# Patient Record
Sex: FEMALE | Race: WHITE
[De-identification: names, ages, dates, MRNs, and addresses within clinical notes are randomized per-mention and may not be internally consistent; named-entity substitution may affect disease eponyms.]

---

## 2018-03-17 ENCOUNTER — HOSPITAL ENCOUNTER (EMERGENCY)
Dept: HOSPITAL 80 - FED | Age: 33
Discharge: HOME | End: 2018-03-17
Payer: SELF-PAY

## 2018-03-17 VITALS
OXYGEN SATURATION: 98 % | SYSTOLIC BLOOD PRESSURE: 125 MMHG | RESPIRATION RATE: 16 BRPM | HEART RATE: 74 BPM | DIASTOLIC BLOOD PRESSURE: 74 MMHG

## 2018-03-17 VITALS — TEMPERATURE: 98.6 F

## 2018-03-17 DIAGNOSIS — M79.605: ICD-10-CM

## 2018-03-17 DIAGNOSIS — E86.9: ICD-10-CM

## 2018-03-17 DIAGNOSIS — R07.9: Primary | ICD-10-CM

## 2018-03-17 LAB — PLATELET # BLD: 245 10^3/UL (ref 150–400)

## 2018-03-17 NOTE — CPEKG
Heart Rate: 92

RR Interval: 652

P-R Interval: 164

QRSD Interval: 86

QT Interval: 356

QTC Interval: 441

P Axis: 72

QRS Axis: 64

T Wave Axis: 66

EKG Severity - NORMAL ECG -

EKG Impression: SINUS RHYTHM

Electronically Signed By: Desire Lewis 17-Mar-2018 21:08:41

## 2018-03-17 NOTE — EDPHY
H & P


Time Seen by Provider: 03/17/18 13:22


HPI/ROS: 





CHIEF COMPLAINT:  Left leg pain, chest pain





HISTORY OF PRESENT ILLNESS:  Patient is a 32-year-old female who presents 

emergency department with multiple complaints.  Patient traveled from [place] 5 

days ago.  She is on a 10 weak all day in the [place]  Last evening she developed 

some mild chest pain.  It is intermittent.  Patient also has left leg pain.  

This initially started in the left calf and now moved to the left thigh.  She 

has no leg swelling.  Patient denies shortness of breath.  No fevers or chills.

  No cough.  No previous clots or family clotting history.





REVIEW OF SYSTEMS:  


My complete review of systems is negative except as mentioned in the HPI.


Past Medical/Surgical History: 





Denies





Past surgical history:  Denies


Smoking Status: Never smoked


Physical Exam: 





Vitals noted.  Heart rate 89. O2 saturation 96%


GENERAL:  Well-appearing, in no acute distress, alert.


HEENT:  Eyes normal to inspection, normal pharynx, no signs of dehydration.


NECK:  No thyromegaly, no lymphadenopathy, supple.


RESPIRATORY:  Clear to auscultation bilaterally, no rales, rhonchi or wheezing.


CVS:  Regular rate and rhythm, no rubs, murmurs, or gallops.


ABDOMEN:  Soft, nontender, nondistended, no organomegaly.


BACK:  Normal to inspection, no CVA tenderness.


SKIN:  Normal color, no rash, warm, dry.  No pallor.


EXTREMITIES:  No pedal edema, no calf tenderness, no Homans sign or cords, no 

joint swelling.  Normal.


NEURO/PSYCH:  Alert and oriented x3, normal mood and affect, normal motor 

sensory exam. 


Constitutional: 


 Initial Vital Signs











Temperature (C)  37 C   03/17/18 12:26


 


Heart Rate  89   03/17/18 12:26


 


Respiratory Rate  18   03/17/18 12:26


 


Blood Pressure  111/79   03/17/18 12:26


 


O2 Sat (%)  96   03/17/18 12:26








 











O2 Delivery Mode               Room Air














Allergies/Adverse Reactions: 


 





No Known Allergies Allergy (Unverified 03/17/18 12:26)


 








Home Medications: 














 Medication  Instructions  Recorded


 


NK [No Known Home Meds]  03/17/18














Medical Decision Making





- Diagnostics


Imaging Results: 


 Imaging Impressions





Chest X-Ray  03/17/18 13:32


Impression: Normal chest x-ray.


 








Extremity Venous Study  03/17/18 13:33


Impression: No deep venous thrombosis in the left lower extremity. 


 


Findings discussed with Desire Lewis M.D.  at  14:29 hour, 3/17/2018.


 


 


 


 


 











ED Course/Re-evaluation: 





In the emergency department I discussed possible etiologies with the patient.  

I answered all her questions.  An IV was placed.  Laboratory studies, chest x-

ray and left lower extremity ultrasound were ordered.





EKG shows normal sinus rhythm, normal rate, normal axis, normal intervals.  

There are no ST or T-wave abnormalities.


EKG is normal as interpreted by me.





CBC and chemistry were normal.  Troponin was negative.  D-dimer negative at the 

0.27.  Pregnancy negative.





Ultrasound of the lower extremity:  Please refer the dictated report.  No DVT 

noted.





Chest x-ray:  No acute disease noted.





I discussed the results with the patient.  I answered all her questions.  She 

was given warnings prior to leaving.  She will return with worsening symptoms.


Differential Diagnosis: 





My differential includes but is not limited to DVT, pulmonary embolus, 

bronchitis, pneumonia, viral illness





- Data Points


Laboratory Results: 


 Laboratory Results





 03/17/18 13:24 





 03/17/18 13:24 





 











  03/17/18 03/17/18 03/17/18





  13:24 13:24 13:24


 


WBC      





    


 


RBC      





    


 


Hgb      





    


 


Hct      





    


 


MCV      





    


 


MCH      





    


 


MCHC      





    


 


RDW      





    


 


Plt Count      





    


 


MPV      





    


 


Neut % (Auto)      





    


 


Lymph % (Auto)      





    


 


Mono % (Auto)      





    


 


Eos % (Auto)      





    


 


Baso % (Auto)      





    


 


Nucleat RBC Rel Count      





    


 


Absolute Neuts (auto)      





    


 


Absolute Lymphs (auto)      





    


 


Absolute Monos (auto)      





    


 


Absolute Eos (auto)      





    


 


Absolute Basos (auto)      





    


 


Absolute Nucleated RBC      





    


 


Immature Gran %      





    


 


Immature Gran #      





    


 


D-Dimer      < 0.27 ug/mLFEU ug/mLFEU





     (0.00-0.50) 


 


Sodium    139 mEq/L mEq/L  





    (135-145)  


 


Potassium    3.9 mEq/L mEq/L  





    (3.5-5.2)  


 


Chloride    103 mEq/L mEq/L  





    ()  


 


Carbon Dioxide    26 mEq/l mEq/l  





    (22-31)  


 


Anion Gap    10 mEq/L mEq/L  





    (8-16)  


 


BUN    13 mg/dL mg/dL  





    (7-23)  


 


Creatinine    0.6 mg/dL mg/dL  





    (0.6-1.0)  


 


Estimated GFR    > 60   





    


 


Glucose    83 mg/dL mg/dL  





    ()  


 


Calcium    9.3 mg/dL mg/dL  





    (8.5-10.4)  


 


Troponin I    < 0.012 ng/mL ng/mL  





    (0.000-0.034)  


 


Beta HCG, Qual  NEGATIVE     





    














  03/17/18





  13:24


 


WBC  6.93 10^3/uL 10^3/uL





   (3.80-9.50) 


 


RBC  4.20 10^6/uL 10^6/uL





   (4.18-5.33) 


 


Hgb  12.6 g/dL g/dL





   (12.6-16.3) 


 


Hct  38.6 % %





   (38.0-47.0) 


 


MCV  91.9 fL fL





   (81.5-99.8) 


 


MCH  30.0 pg pg





   (27.9-34.1) 


 


MCHC  32.6 g/dL g/dL





   (32.4-36.7) 


 


RDW  12.3 % %





   (11.5-15.2) 


 


Plt Count  245 10^3/uL 10^3/uL





   (150-400) 


 


MPV  9.7 fL fL





   (8.7-11.7) 


 


Neut % (Auto)  58.2 % %





   (39.3-74.2) 


 


Lymph % (Auto)  31.3 % %





   (15.0-45.0) 


 


Mono % (Auto)  8.2 % %





   (4.5-13.0) 


 


Eos % (Auto)  1.6 % %





   (0.6-7.6) 


 


Baso % (Auto)  0.6 % %





   (0.3-1.7) 


 


Nucleat RBC Rel Count  0.0 % %





   (0.0-0.2) 


 


Absolute Neuts (auto)  4.03 10^3/uL 10^3/uL





   (1.70-6.50) 


 


Absolute Lymphs (auto)  2.17 10^3/uL 10^3/uL





   (1.00-3.00) 


 


Absolute Monos (auto)  0.57 10^3/uL 10^3/uL





   (0.30-0.80) 


 


Absolute Eos (auto)  0.11 10^3/uL 10^3/uL





   (0.03-0.40) 


 


Absolute Basos (auto)  0.04 10^3/uL 10^3/uL





   (0.02-0.10) 


 


Absolute Nucleated RBC  0.00 10^3/uL 10^3/uL





   (0-0.01) 


 


Immature Gran %  0.1 % %





   (0.0-1.1) 


 


Immature Gran #  0.01 10^3/uL 10^3/uL





   (0.00-0.10) 


 


D-Dimer  





  


 


Sodium  





  


 


Potassium  





  


 


Chloride  





  


 


Carbon Dioxide  





  


 


Anion Gap  





  


 


BUN  





  


 


Creatinine  





  


 


Estimated GFR  





  


 


Glucose  





  


 


Calcium  





  


 


Troponin I  





  


 


Beta HCG, Qual  





  











Medications Given: 


 








Discontinued Medications





Aspirin (Aspirin)  324 mg PO EDNOW ONE


   Stop: 03/17/18 13:33


   Last Admin: 03/17/18 13:41 Dose:  324 mg


Sodium Chloride (Ns)  500 mls @ 1,000 mls/hr IV EDNOW ONE


   PRN Reason: Protocol


   Stop: 03/17/18 14:01


   Last Admin: 03/17/18 13:41 Dose:  500 mls








Departure





- Departure


Disposition: Home, Routine, Self-Care


Clinical Impression: 


 Left leg pain





Chest pain


Qualifiers:


 Chest pain type: unspecified Qualified Code(s): R07.9 - Chest pain, unspecified





Condition: Good


Instructions:  Leg Pain (ED), Chest Pain (ED)


Additional Instructions: 


Your ultrasound was negative.  If you have increasing pain, shortness of breath 

or any other concerns return to the emergency department.


Referrals: 


Kvng Tinsley [Doctor of Osteopathy] - 2-3 days, if not improved